# Patient Record
Sex: FEMALE | Race: WHITE | Employment: FULL TIME | URBAN - METROPOLITAN AREA
[De-identification: names, ages, dates, MRNs, and addresses within clinical notes are randomized per-mention and may not be internally consistent; named-entity substitution may affect disease eponyms.]

---

## 2017-12-15 ENCOUNTER — OFFICE VISIT (OUTPATIENT)
Dept: PEDIATRICS | Age: 22
End: 2017-12-15

## 2017-12-15 VITALS
SYSTOLIC BLOOD PRESSURE: 104 MMHG | RESPIRATION RATE: 16 BRPM | OXYGEN SATURATION: 99 % | WEIGHT: 196.4 LBS | HEART RATE: 85 BPM | DIASTOLIC BLOOD PRESSURE: 70 MMHG | TEMPERATURE: 98.5 F

## 2017-12-15 DIAGNOSIS — L73.9 FOLLICULITIS: Primary | ICD-10-CM

## 2017-12-15 PROCEDURE — 99203 OFFICE O/P NEW LOW 30 MIN: CPT | Performed by: NURSE PRACTITIONER

## 2017-12-15 RX ORDER — MUPIROCIN CALCIUM 20 MG/G
CREAM TOPICAL
Qty: 1 TUBE | Refills: 1 | Status: SHIPPED | OUTPATIENT
Start: 2017-12-15 | End: 2018-01-14

## 2017-12-15 RX ORDER — NORETHINDRONE ACETATE AND ETHINYL ESTRADIOL AND FERROUS FUMARATE 1MG-20(21)
KIT ORAL
Refills: 3 | COMMUNITY
Start: 2017-11-14 | End: 2018-05-14 | Stop reason: CLARIF

## 2017-12-15 RX ORDER — DOXYCYCLINE 100 MG/1
100 CAPSULE ORAL 2 TIMES DAILY
Qty: 14 CAPSULE | Refills: 0 | Status: SHIPPED | OUTPATIENT
Start: 2017-12-15 | End: 2017-12-22

## 2017-12-15 ASSESSMENT — ENCOUNTER SYMPTOMS
DIARRHEA: 0
COUGH: 0
SHORTNESS OF BREATH: 0
RHINORRHEA: 0
SORE THROAT: 0
VOMITING: 0

## 2017-12-15 NOTE — PROGRESS NOTES
Subjective:      Patient ID: Chad Yadav is a 20 y.o. female who presents today with a complaint of   Chief Complaint   Patient presents with    Rash     arms, small red bumps           Rash   This is a new problem. Episode onset: 2days  The problem has been gradually worsening since onset. Location: mostly on left arm, some on right arm. The rash is characterized by itchiness and redness (bumps). She was exposed to nothing. Pertinent negatives include no congestion, cough, diarrhea, fatigue, fever, rhinorrhea, shortness of breath, sore throat or vomiting. Treatments tried: hydrocortisone cream    Patient shaves arms   Mildly pruritic, not any worse at night. No past medical history on file. No past surgical history on file. No family history on file. Social History     Social History    Marital status: Single     Spouse name: N/A    Number of children: N/A    Years of education: N/A     Occupational History    Not on file. Social History Main Topics    Smoking status: Never Smoker    Smokeless tobacco: Never Used    Alcohol use Not on file    Drug use: Unknown    Sexual activity: Not on file     Other Topics Concern    Not on file     Social History Narrative    No narrative on file       Allergies:  Review of patient's allergies indicates no known allergies. Review of Systems   Constitutional: Negative for fatigue and fever. HENT: Negative for congestion, rhinorrhea and sore throat. Respiratory: Negative for cough and shortness of breath. Gastrointestinal: Negative for diarrhea and vomiting. Skin: Positive for rash. Objective:   /70 (Site: Left Arm, Position: Sitting, Cuff Size: Medium Adult)   Pulse 85   Temp 98.5 °F (36.9 °C) (Temporal)   Resp 16   Wt 196 lb 6.4 oz (89.1 kg)   LMP 11/27/2017 (Exact Date)   SpO2 99%   Physical Exam   Constitutional: She appears well-developed and well-nourished. No distress.    Cardiovascular: Normal rate, regular

## 2018-04-17 ENCOUNTER — OFFICE VISIT (OUTPATIENT)
Dept: PEDIATRICS CLINIC | Age: 23
End: 2018-04-17
Payer: COMMERCIAL

## 2018-04-17 VITALS
OXYGEN SATURATION: 99 % | WEIGHT: 194.6 LBS | SYSTOLIC BLOOD PRESSURE: 116 MMHG | RESPIRATION RATE: 16 BRPM | HEART RATE: 74 BPM | DIASTOLIC BLOOD PRESSURE: 76 MMHG | TEMPERATURE: 98 F

## 2018-04-17 DIAGNOSIS — B37.0 THRUSH: Primary | ICD-10-CM

## 2018-04-17 PROCEDURE — 99214 OFFICE O/P EST MOD 30 MIN: CPT | Performed by: NURSE PRACTITIONER

## 2018-04-17 RX ORDER — FLUCONAZOLE 100 MG/1
100 TABLET ORAL DAILY
Qty: 3 TABLET | Refills: 0 | Status: SHIPPED | OUTPATIENT
Start: 2018-04-17 | End: 2018-04-20

## 2018-04-19 PROBLEM — M25.561 PAIN IN RIGHT KNEE: Status: ACTIVE | Noted: 2017-10-25

## 2018-04-19 ASSESSMENT — ENCOUNTER SYMPTOMS
EYE REDNESS: 0
SORE THROAT: 0
NAUSEA: 0
DOUBLE VISION: 0
CONSTIPATION: 0
VOMITING: 0
SWOLLEN GLANDS: 0
PHOTOPHOBIA: 0
EYE ITCHING: 0
EYE DISCHARGE: 0
RHINORRHEA: 0
ORTHOPNEA: 0
DIARRHEA: 0
WHEEZING: 0
ABDOMINAL PAIN: 0
TROUBLE SWALLOWING: 0
COUGH: 0
STRIDOR: 0
EYE PAIN: 0

## 2018-05-02 ENCOUNTER — TELEPHONE (OUTPATIENT)
Dept: PEDIATRICS CLINIC | Age: 23
End: 2018-05-02

## 2018-05-02 DIAGNOSIS — B37.0 THRUSH: ICD-10-CM

## 2018-05-03 RX ORDER — FLUCONAZOLE 100 MG/1
TABLET ORAL
Refills: 0 | COMMUNITY
Start: 2018-04-24 | End: 2018-05-14 | Stop reason: ALTCHOICE

## 2018-05-03 RX ORDER — FLUCONAZOLE 150 MG/1
150 TABLET ORAL DAILY
Qty: 3 TABLET | Refills: 0 | Status: SHIPPED | OUTPATIENT
Start: 2018-05-03 | End: 2018-05-14 | Stop reason: ALTCHOICE

## 2018-05-10 ENCOUNTER — OFFICE VISIT (OUTPATIENT)
Dept: PEDIATRICS CLINIC | Age: 23
End: 2018-05-10
Payer: COMMERCIAL

## 2018-05-10 VITALS
BODY MASS INDEX: 32.72 KG/M2 | TEMPERATURE: 97.4 F | OXYGEN SATURATION: 99 % | WEIGHT: 196.4 LBS | SYSTOLIC BLOOD PRESSURE: 118 MMHG | DIASTOLIC BLOOD PRESSURE: 80 MMHG | HEIGHT: 65 IN | HEART RATE: 90 BPM

## 2018-05-10 DIAGNOSIS — K14.3 TONGUE COATING: Primary | ICD-10-CM

## 2018-05-10 PROCEDURE — G8427 DOCREV CUR MEDS BY ELIG CLIN: HCPCS | Performed by: NURSE PRACTITIONER

## 2018-05-10 PROCEDURE — 99214 OFFICE O/P EST MOD 30 MIN: CPT | Performed by: NURSE PRACTITIONER

## 2018-05-10 PROCEDURE — 1036F TOBACCO NON-USER: CPT | Performed by: NURSE PRACTITIONER

## 2018-05-10 PROCEDURE — G8417 CALC BMI ABV UP PARAM F/U: HCPCS | Performed by: NURSE PRACTITIONER

## 2018-05-14 RX ORDER — DROSPIRENONE AND ETHINYL ESTRADIOL 0.03MG-3MG
KIT ORAL
Refills: 3 | COMMUNITY
Start: 2018-05-03

## 2018-05-14 ASSESSMENT — ENCOUNTER SYMPTOMS
CONSTIPATION: 0
EYE REDNESS: 0
NAUSEA: 0
EYE PAIN: 0
PHOTOPHOBIA: 0
DOUBLE VISION: 0
ABDOMINAL PAIN: 0
ORTHOPNEA: 0
DIARRHEA: 0
VOMITING: 0
RHINORRHEA: 0
STRIDOR: 0
EYE ITCHING: 0
SORE THROAT: 0
EYE DISCHARGE: 0
SWOLLEN GLANDS: 0
COUGH: 0
WHEEZING: 0

## 2019-10-14 ENCOUNTER — OFFICE VISIT (OUTPATIENT)
Dept: PEDIATRICS CLINIC | Age: 24
End: 2019-10-14
Payer: COMMERCIAL

## 2019-10-14 DIAGNOSIS — Z00.00 WELL ADULT ON ROUTINE HEALTH CHECK: Primary | ICD-10-CM

## 2019-10-14 PROCEDURE — 90651 9VHPV VACCINE 2/3 DOSE IM: CPT | Performed by: NURSE PRACTITIONER

## 2019-10-14 PROCEDURE — G8484 FLU IMMUNIZE NO ADMIN: HCPCS | Performed by: NURSE PRACTITIONER

## 2019-10-14 PROCEDURE — 99395 PREV VISIT EST AGE 18-39: CPT | Performed by: NURSE PRACTITIONER

## 2019-10-14 PROCEDURE — 90471 IMMUNIZATION ADMIN: CPT | Performed by: NURSE PRACTITIONER

## 2019-10-15 VITALS
TEMPERATURE: 97 F | HEIGHT: 65 IN | DIASTOLIC BLOOD PRESSURE: 76 MMHG | SYSTOLIC BLOOD PRESSURE: 124 MMHG | OXYGEN SATURATION: 98 % | RESPIRATION RATE: 14 BRPM | BODY MASS INDEX: 31.92 KG/M2 | WEIGHT: 191.6 LBS | HEART RATE: 100 BPM

## 2019-10-21 ASSESSMENT — ENCOUNTER SYMPTOMS
VOMITING: 0
WHEEZING: 0
TROUBLE SWALLOWING: 0
ABDOMINAL PAIN: 0
COUGH: 0
RHINORRHEA: 0
SORE THROAT: 0
NAUSEA: 0
SHORTNESS OF BREATH: 0

## 2019-12-13 ENCOUNTER — NURSE ONLY (OUTPATIENT)
Dept: PEDIATRICS CLINIC | Age: 24
End: 2019-12-13
Payer: COMMERCIAL

## 2019-12-13 DIAGNOSIS — Z23 NEED FOR HPV VACCINE: Primary | ICD-10-CM

## 2019-12-13 PROCEDURE — 90471 IMMUNIZATION ADMIN: CPT | Performed by: NURSE PRACTITIONER

## 2019-12-13 PROCEDURE — 90651 9VHPV VACCINE 2/3 DOSE IM: CPT | Performed by: NURSE PRACTITIONER

## 2020-01-16 ENCOUNTER — OFFICE VISIT (OUTPATIENT)
Dept: PEDIATRICS CLINIC | Age: 25
End: 2020-01-16
Payer: COMMERCIAL

## 2020-01-16 VITALS — WEIGHT: 195 LBS | TEMPERATURE: 98.1 F | OXYGEN SATURATION: 99 % | HEART RATE: 104 BPM | BODY MASS INDEX: 32.95 KG/M2

## 2020-01-16 PROCEDURE — G8427 DOCREV CUR MEDS BY ELIG CLIN: HCPCS | Performed by: NURSE PRACTITIONER

## 2020-01-16 PROCEDURE — G8417 CALC BMI ABV UP PARAM F/U: HCPCS | Performed by: NURSE PRACTITIONER

## 2020-01-16 PROCEDURE — 99213 OFFICE O/P EST LOW 20 MIN: CPT | Performed by: NURSE PRACTITIONER

## 2020-01-16 PROCEDURE — 1036F TOBACCO NON-USER: CPT | Performed by: NURSE PRACTITIONER

## 2020-01-16 PROCEDURE — G8484 FLU IMMUNIZE NO ADMIN: HCPCS | Performed by: NURSE PRACTITIONER

## 2020-01-16 ASSESSMENT — ENCOUNTER SYMPTOMS
BLURRED VISION: 0
EYE DISCHARGE: 1
PHOTOPHOBIA: 0
EYE ITCHING: 1
EYE REDNESS: 0
FOREIGN BODY SENSATION: 0

## 2020-01-16 NOTE — PROGRESS NOTES
Subjective:      Patient ID: Jaleel Harding is a 21 y.o. female who presents today with a complaint of   Chief Complaint   Patient presents with    Eye Problem     x 3 days Patient present and states that she has left eye redness pain and drainage she is also c/o tear duct pain. Eye Problem    The left eye is affected. This is a new problem. Episode onset: 2 days  The problem occurs constantly (today ). The problem has been gradually worsening. There was no injury mechanism. There is known exposure (works at  ) to pink eye. She does not wear contacts. Associated symptoms include an eye discharge (watery, no mucous ), itching (more irritating ) and a recent URI. Pertinent negatives include no blurred vision, eye redness, foreign body sensation or photophobia. She has tried nothing for the symptoms. No past medical history on file. No past surgical history on file. No family history on file.   Social History     Socioeconomic History    Marital status: Single     Spouse name: Not on file    Number of children: Not on file    Years of education: Not on file    Highest education level: Not on file   Occupational History    Not on file   Social Needs    Financial resource strain: Not on file    Food insecurity:     Worry: Not on file     Inability: Not on file    Transportation needs:     Medical: Not on file     Non-medical: Not on file   Tobacco Use    Smoking status: Never Smoker    Smokeless tobacco: Never Used   Substance and Sexual Activity    Alcohol use: Not on file    Drug use: Not on file    Sexual activity: Not on file   Lifestyle    Physical activity:     Days per week: Not on file     Minutes per session: Not on file    Stress: Not on file   Relationships    Social connections:     Talks on phone: Not on file     Gets together: Not on file     Attends [employer] service: Not on file     Active member of club or organization: Not on file     Attends meetings of clubs or organizations: Not on file     Relationship status: Not on file    Intimate partner violence:     Fear of current or ex partner: Not on file     Emotionally abused: Not on file     Physically abused: Not on file     Forced sexual activity: Not on file   Other Topics Concern    Not on file   Social History Narrative    Not on file       Allergies: Other      Review of Systems   Eyes: Positive for discharge (watery, no mucous ) and itching (more irritating ). Negative for blurred vision, photophobia and redness. Objective:   Pulse 104   Temp 98.1 °F (36.7 °C) (Temporal)   Wt 195 lb (88.5 kg)   SpO2 99%   BMI 32.95 kg/m²   Physical Exam  Constitutional:       Appearance: She is not ill-appearing. Eyes:      General:         Left eye: Discharge (watery ) present. No foreign body. Conjunctiva/sclera:      Left eye: Left conjunctiva is not injected. No exudate. Cardiovascular:      Rate and Rhythm: Normal rate. Pulmonary:      Effort: Pulmonary effort is normal.   Lymphadenopathy:      Cervical: No cervical adenopathy. Neurological:      Mental Status: She is alert. No results found for this visit on 01/16/20. Assessment:       Diagnosis Orders   1. Eye irritation             Plan:      No orders of the defined types were placed in this encounter. No orders of the defined types were placed in this encounter. Eye and tear duct appear normal at this time. Some tearing noted. Advised warm compress and gentle duct massage. If she wakes up with lots of crusting and mucous in next few days she can call and I will send antibiotic drop. But at this time it does not appear to be bacterial pink eye, just irritation. Return if symptoms worsen or fail to improve.     Belén Kingston, APRN - CNP